# Patient Record
Sex: MALE | RURAL
[De-identification: names, ages, dates, MRNs, and addresses within clinical notes are randomized per-mention and may not be internally consistent; named-entity substitution may affect disease eponyms.]

---

## 2021-05-25 ENCOUNTER — OFFICE VISIT CONVERTED (OUTPATIENT)
Dept: CARDIOLOGY | Facility: CLINIC | Age: 66
End: 2021-05-25
Attending: INTERNAL MEDICINE

## 2021-05-25 ENCOUNTER — CONVERSION ENCOUNTER (OUTPATIENT)
Dept: OTHER | Facility: HOSPITAL | Age: 66
End: 2021-05-25

## 2021-06-05 NOTE — H&P
History and Physical      Patient Name: Branden Neal   Patient ID: [id number]   Sex: Male   YOB: 1955    Referring Provider: JOAO FOURNIER    Visit Date: May 25, 2021    Provider: Branden Schmidt MD   Location: [facility]   Location Address: [address] [place] [address]  [id number]          History Of Present Illness  Consult requested by: JOAO FOURNIER   Branden Neal is a 65 year old white male who was seen by me in the office today. He has no previous cardiac history. He has a history of hypertension. He was recently seen for a primary care visit and he had mild sinus bradycardia on EKG, and a cardiac evaluation has been requested. At that point, his Metoprolol dose was cut down to 50 mg twice a day. He denies chest pain. He has occasional fleeting palpitations. He has minor shortness of breath only with heavy exertion but otherwise feels relatively well. He is a smoker.   PAST MEDICAL HISTORY: Diabetes; hypertension; arthritis.   PSYCHOSOCIAL HISTORY: The patient is . He has caffeine daily. Uses alcohol daily. Smokes one pack per day.   FAMILY HISTORY: Positive for diabetes and hypertension. Negative for heart disease.   CURRENT MEDICATIONS: Cetirizine 10 mg daily; Amlodipine 10 mg daily; aspirin 81 mg daily; Atorvastatin 40 mg daily; Benazepril 40 mg daily; Chlorthalidone 25 mg daily; Famotidine 20 mg twice daily; Fluoxetine 20 mg at bedtime; Metformin 500 mg twice daily; Metoprolol tartrate 50 mg twice daily.   ALLERGIES: No known drug allergies.       Review of Systems  · Constitutional  o Admits  o : fatigue  o Denies  o : good general health lately, recent weight changes   · Eyes  o Denies  o : double vision, blurred vision  · HENT  o Admits  o : hearing loss or ringing  o Denies  o : chronic sinus problem, swollen glands in neck  · Cardiovascular  o Admits  o : palpitations (fast, fluttering, or skipping beats), shortness of breath while  "walking or lying flat  o Denies  o : chest pain, swelling (feet, ankles, hands)  · Respiratory  o Admits  o : asthma or wheezing, COPD  o Denies  o : chronic or frequent cough  · Gastrointestinal  o Denies  o : ulcers, nausea or vomiting  · Neurologic  o Admits  o : lightheaded or dizzy  o Denies  o : stroke, headaches  · Musculoskeletal  o Admits  o : joint pain, back pain  · Endocrine  o Admits  o : diabetes  o Denies  o : thyroid disease, heat or cold intolerance, excessive thirst or urination  · Heme-Lymph  o Admits  o : bleeding or bruising tendency  o Denies  o : anemia      Vitals  Date Time BP Position Site L\R Cuff Size HR RR TEMP (F) WT  HT  BMI kg/m2 BSA m2 O2 Sat FR L/min FiO2        05/25/2021 01:29 /70 Sitting    72 - R   198lbs 8oz 5'  10\" 28.48 2.11             Physical Examination  · Constitutional  o Appearance  o : Normal white male, pleasant, in no acute distress.  · Head and Face  o HEENT  o : No pallor, anicteric. Eyes normal. Moist mucous membranes.  · Neck  o Inspection/Palpation  o : Supple.   o Jugular Veins  o : No JVD. No carotid bruits.  · Respiratory  o Auscultation of Lungs  o : Clear to auscultation bilaterally. No crackles or wheezing.  · Cardiovascular  o Heart  o : S1, S2 is normally heard. No S3. No murmur, rubs, or gallops.  · Gastrointestinal  o Abdominal Examination  o : Soft, non-distended. No palpable hepatosplenomegaly. Bowel sounds heard in all four quadrants.  · Musculoskeletal  o General  o : Normal muscle tone and strength.  · Skin and Subcutaneous Tissue  o General Inspection  o : No skin rashes.  · Extremities  o Extremities  o : Warm and well perfused. Distal pulses present. No pitting pedal edema.  · EKG  o EKG  o : EKG from 05/12/2021 was reviewed by me.  o Results  o : Sinus bradycardia, rate 54. Mild left axis deviation. No ST changes.  · Labs  o Labs  o : Laboratory studies reviewed. Lipid panel, TSH, and chemistry normal.     Primary care records " reviewed.           Assessment     1.  Sinus bradycardia, asymptomatic. This was mild on recent EKG, likely contributed to by beta-blocker dose. It seems to be better since the Metoprolol was reduced.   2.  Hypertension, currently controlled.   3.  Smoking.          Plan     1.  Counseled the patient for about 5 minutes today on quitting smoking. We discussed the risks and benefits.        He currently is not committed enough to pick a quit date, but is going to start giving it some more thought.        His wife also is a smoker and I suggested that they try to get motivated to quit together.   2.  In terms of his blood pressure medication, I agree with the current regimen. He can stay on the medium        dose Metoprolol. He needs to drink more fluids when he is outside working since he has some orthostatic        symptoms probably induced by the diuretic which was added recently.   3.  No additional cardiac workup is needed at this time. I will see the patient back as needed. In the future, if        symptoms arise. He is agreeable with this plan.      ALVA Schmidt MD   CBD/pap                  Electronically Signed by: Vanesa David-, Other -Author on May 27, 2021 02:13:15 PM  Electronically Co-signed by: Branden Schmidt MD -Reviewer on May 28, 2021 09:02:48 AM

## 2021-07-15 VITALS
SYSTOLIC BLOOD PRESSURE: 129 MMHG | DIASTOLIC BLOOD PRESSURE: 70 MMHG | WEIGHT: 198.5 LBS | HEIGHT: 70 IN | BODY MASS INDEX: 28.42 KG/M2 | HEART RATE: 72 BPM